# Patient Record
Sex: MALE | Race: BLACK OR AFRICAN AMERICAN | NOT HISPANIC OR LATINO | Employment: FULL TIME
[De-identification: names, ages, dates, MRNs, and addresses within clinical notes are randomized per-mention and may not be internally consistent; named-entity substitution may affect disease eponyms.]

---

## 2022-02-25 ENCOUNTER — TRANSCRIBE ORDERS (OUTPATIENT)
Dept: OTHER | Age: 47
End: 2022-02-25

## 2022-02-25 DIAGNOSIS — S39.012A STRAIN OF MUSCLE, FASCIA AND TENDON OF LOWER BACK, INITIAL ENCOUNTER: Primary | ICD-10-CM

## 2022-02-25 DIAGNOSIS — M53.3 PAIN OF BOTH SACROILIAC JOINTS: ICD-10-CM

## 2024-05-10 ENCOUNTER — MEDICAL CORRESPONDENCE (OUTPATIENT)
Dept: HEALTH INFORMATION MANAGEMENT | Facility: CLINIC | Age: 49
End: 2024-05-10
Payer: COMMERCIAL

## 2024-05-13 ENCOUNTER — TRANSCRIBE ORDERS (OUTPATIENT)
Dept: OTHER | Age: 49
End: 2024-05-13

## 2024-05-13 DIAGNOSIS — R06.00 DYSPNEA: Primary | ICD-10-CM

## 2024-05-14 ENCOUNTER — TRANSCRIBE ORDERS (OUTPATIENT)
Dept: OTHER | Age: 49
End: 2024-05-14

## 2024-05-14 DIAGNOSIS — R06.00 DYSPNEA: Primary | ICD-10-CM

## 2024-05-23 RX ORDER — ALBUTEROL SULFATE 0.83 MG/ML
2.5 SOLUTION RESPIRATORY (INHALATION) ONCE
Status: COMPLETED | OUTPATIENT
Start: 2024-05-24 | End: 2024-05-24

## 2024-05-24 ENCOUNTER — HOSPITAL ENCOUNTER (OUTPATIENT)
Dept: RADIOLOGY | Facility: CLINIC | Age: 49
Discharge: HOME | End: 2024-05-24
Attending: PHYSICAL MEDICINE & REHABILITATION
Payer: OTHER GOVERNMENT

## 2024-05-24 ENCOUNTER — HOSPITAL ENCOUNTER (OUTPATIENT)
Dept: RESPIRATORY THERAPY | Facility: CLINIC | Age: 49
Discharge: HOME | End: 2024-05-24
Attending: PHYSICAL MEDICINE & REHABILITATION
Payer: OTHER GOVERNMENT

## 2024-05-24 DIAGNOSIS — R06.00 DYSPNEA: ICD-10-CM

## 2024-05-24 PROCEDURE — 94060 EVALUATION OF WHEEZING: CPT

## 2024-05-24 PROCEDURE — 71046 X-RAY EXAM CHEST 2 VIEWS: CPT

## 2024-05-24 PROCEDURE — 250N000009 HC RX 250

## 2024-05-24 PROCEDURE — 999N000157 HC STATISTIC RCP TIME EA 10 MIN

## 2024-05-24 RX ADMIN — ALBUTEROL SULFATE 2.5 MG: 2.5 SOLUTION RESPIRATORY (INHALATION) at 08:45

## 2024-05-24 NOTE — PROGRESS NOTES
RESPIRATORY CARE NOTE    Pre and Post Spirometry with BD Pulmonary Function Test completed by patient.  Good patient effort and cooperation. Albuterol 2.5 mg neb given for bronchodilation.  The results of this test meet the ATS standards for acceptability and repeatability. PT returned to baseline and left in no distress.    Shanna Silva, RT  5/24/2024

## 2024-05-26 LAB
EXPTIME-PRE: 5.51 SEC
FEF2575-%PRED-POST: 113 %
FEF2575-%PRED-PRE: 100 %
FEF2575-POST: 3.95 L/SEC
FEF2575-PRE: 3.51 L/SEC
FEF2575-PRED: 3.48 L/SEC
FEFMAX-%PRED-PRE: 89 %
FEFMAX-PRE: 8.85 L/SEC
FEFMAX-PRED: 9.9 L/SEC
FEV1-%PRED-PRE: 94 %
FEV1-PRE: 3.52 L
FEV1FEV6-PRE: 82 %
FEV1FEV6-PRED: 81 %
FEV1FVC-PRE: 83 %
FEV1FVC-PRED: 80 %
FIFMAX-PRE: 6.97 L/SEC
FVC-%PRED-PRE: 92 %
FVC-PRE: 4.27 L
FVC-PRED: 4.64 L

## 2024-07-28 ENCOUNTER — HEALTH MAINTENANCE LETTER (OUTPATIENT)
Age: 49
End: 2024-07-28

## 2025-08-10 ENCOUNTER — HEALTH MAINTENANCE LETTER (OUTPATIENT)
Age: 50
End: 2025-08-10